# Patient Record
Sex: MALE | Race: WHITE | NOT HISPANIC OR LATINO | Employment: OTHER | URBAN - METROPOLITAN AREA
[De-identification: names, ages, dates, MRNs, and addresses within clinical notes are randomized per-mention and may not be internally consistent; named-entity substitution may affect disease eponyms.]

---

## 2023-09-06 PROBLEM — T14.8XXA BLEEDING FROM WOUND: Status: ACTIVE | Noted: 2023-09-06

## 2023-09-06 PROBLEM — L81.4 SOLAR LENTIGO: Status: ACTIVE | Noted: 2023-09-06

## 2023-09-06 PROBLEM — Z85.828 HISTORY OF BASAL CELL CARCINOMA (BCC): Status: ACTIVE | Noted: 2023-09-06

## 2023-09-06 PROBLEM — D18.00 ANGIOMA: Status: ACTIVE | Noted: 2023-09-06

## 2023-09-06 PROBLEM — I10 HYPERTENSION: Status: ACTIVE | Noted: 2023-09-06

## 2023-09-06 PROBLEM — L57.0 ACTINIC KERATOSES: Status: ACTIVE | Noted: 2023-09-06

## 2023-09-06 PROBLEM — E78.5 HYPERLIPEMIA: Status: ACTIVE | Noted: 2023-09-06

## 2023-09-06 PROBLEM — L82.1 SEBORRHEIC KERATOSES: Status: ACTIVE | Noted: 2023-09-06

## 2023-09-06 PROBLEM — L81.9 CHANGING PIGMENTED SKIN LESION: Status: ACTIVE | Noted: 2023-09-06

## 2023-09-06 PROBLEM — J33.9 NASAL POLYPS: Status: ACTIVE | Noted: 2023-09-06

## 2023-09-06 PROBLEM — R79.89 ELEVATED LFTS: Status: ACTIVE | Noted: 2023-09-06

## 2023-09-06 RX ORDER — HYDROCHLOROTHIAZIDE 12.5 MG/1
1 CAPSULE ORAL DAILY
COMMUNITY
Start: 2020-09-01

## 2023-09-06 RX ORDER — SIMVASTATIN 20 MG/1
TABLET, FILM COATED ORAL
COMMUNITY

## 2023-09-07 ENCOUNTER — OFFICE VISIT (OUTPATIENT)
Dept: PRIMARY CARE | Facility: CLINIC | Age: 78
End: 2023-09-07
Payer: MEDICARE

## 2023-09-07 VITALS
TEMPERATURE: 97.8 F | SYSTOLIC BLOOD PRESSURE: 128 MMHG | HEART RATE: 54 BPM | HEIGHT: 68 IN | BODY MASS INDEX: 25.91 KG/M2 | RESPIRATION RATE: 16 BRPM | DIASTOLIC BLOOD PRESSURE: 72 MMHG | WEIGHT: 171 LBS

## 2023-09-07 DIAGNOSIS — Z00.00 ROUTINE GENERAL MEDICAL EXAMINATION AT HEALTH CARE FACILITY: Primary | ICD-10-CM

## 2023-09-07 DIAGNOSIS — I70.0 ATHEROSCLEROSIS OF AORTA (CMS-HCC): ICD-10-CM

## 2023-09-07 PROCEDURE — 1159F MED LIST DOCD IN RCRD: CPT | Performed by: FAMILY MEDICINE

## 2023-09-07 PROCEDURE — 1160F RVW MEDS BY RX/DR IN RCRD: CPT | Performed by: FAMILY MEDICINE

## 2023-09-07 PROCEDURE — G0439 PPPS, SUBSEQ VISIT: HCPCS | Performed by: FAMILY MEDICINE

## 2023-09-07 PROCEDURE — 1036F TOBACCO NON-USER: CPT | Performed by: FAMILY MEDICINE

## 2023-09-07 PROCEDURE — 3078F DIAST BP <80 MM HG: CPT | Performed by: FAMILY MEDICINE

## 2023-09-07 PROCEDURE — 1170F FXNL STATUS ASSESSED: CPT | Performed by: FAMILY MEDICINE

## 2023-09-07 PROCEDURE — 3074F SYST BP LT 130 MM HG: CPT | Performed by: FAMILY MEDICINE

## 2023-09-07 PROCEDURE — 1126F AMNT PAIN NOTED NONE PRSNT: CPT | Performed by: FAMILY MEDICINE

## 2023-09-07 ASSESSMENT — ACTIVITIES OF DAILY LIVING (ADL)
BATHING: INDEPENDENT
DOING_HOUSEWORK: INDEPENDENT
MANAGING_FINANCES: INDEPENDENT
TAKING_MEDICATION: INDEPENDENT
GROCERY_SHOPPING: INDEPENDENT
DRESSING: INDEPENDENT

## 2023-09-07 ASSESSMENT — PATIENT HEALTH QUESTIONNAIRE - PHQ9
SUM OF ALL RESPONSES TO PHQ9 QUESTIONS 1 AND 2: 0
2. FEELING DOWN, DEPRESSED OR HOPELESS: NOT AT ALL
1. LITTLE INTEREST OR PLEASURE IN DOING THINGS: NOT AT ALL

## 2023-09-07 NOTE — PROGRESS NOTES
History Of Present Illness  Carlo Dunne is a 77 y.o. male presenting for a Medicare Annual Wellness Exam.  Patient is here for a periodic health exam.  I reviewed previous preventative health measures including screening tests, immunizations and labs.  I reviewed screenings administered by my staff today.    He goes to [facility].  Has labs every 6 months with them and always good.  I do have labs from September 2022 that were scanned into the chart.  I do not see a PSA on these labs but he says it is definitely checked every year and has been normal.  He does not not want me to order labs because they are fully covered at the [facility].  He says he will mail us a copy of the labs that he will be getting in the near future.    He never gets immunizations and refuses them again today.  He feels they are not needed and not safe.  He and I talked about this many times but he adamantly refuses vaccines.       PREVIOUS PREVENTATIVE HEALTH  Colonoscopy : Yes    Date: 3/8/2018  Cologuard : No  Prostate cancer screening with PSA : No  Hepatitis C Antibody : No  Prevnar : No  Shingrix : No  Tdap within 10 years : Yes  Yearly Flu Shot : No    CURRENT FINDINGS  Falls : No  Problems with ADL's :  No  Healthy Diet : Yes  Exercise :  Yes  Vision or Hearing Problems : No  Depression Issues : No  Alcohol Use : No  Tobacco Use : No         Past Medical History  Patient Active Problem List   Diagnosis    Actinic keratoses    Angioma    Bleeding from wound    Changing pigmented skin lesion    Elevated LFTs    History of basal cell carcinoma (BCC)    Hyperlipemia    Hypertension    Nasal polyps    Seborrheic keratoses    Solar lentigo    Atherosclerosis of aorta (CMS/HCC)       History reviewed. No pertinent surgical history.     Current Outpatient Medications:     hydroCHLOROthiazide (Microzide) 12.5 mg capsule, Take 1 capsule (12.5 mg) by mouth once daily., Disp: , Rfl:     simvastatin (Zocor) 20 mg tablet, Take by mouth.,  "Disp: , Rfl:    Immunization History   Administered Date(s) Administered    Td vaccine, age 7 years and older (TDVAX) 12/08/2017        Social History  Social History     Socioeconomic History    Marital status:      Spouse name: Not on file    Number of children: Not on file    Years of education: Not on file    Highest education level: Not on file   Occupational History    Not on file   Tobacco Use    Smoking status: Never    Smokeless tobacco: Never   Vaping Use    Vaping Use: Never used   Substance and Sexual Activity    Alcohol use: Not Currently    Drug use: Never    Sexual activity: Not on file   Other Topics Concern    Not on file   Social History Narrative    Not on file     Social Determinants of Health     Financial Resource Strain: Not on file   Food Insecurity: Not on file   Transportation Needs: Not on file   Physical Activity: Not on file   Stress: Not on file   Social Connections: Not on file   Intimate Partner Violence: Not on file   Housing Stability: Not on file        reports that he does not currently use alcohol.    reports that he has never smoked. He has never used smokeless tobacco.    reports no history of drug use.           Allergies  Patient has no known allergies.      Physical Exam   height is 1.727 m (5' 8\") and weight is 77.6 kg (171 lb). His temperature is 36.6 °C (97.8 °F). His blood pressure is 128/72 and his pulse is 54. His respiration is 16.    Physical Exam  Vitals and nursing note reviewed.   Constitutional:       General: He is not in acute distress.     Appearance: Normal appearance.   HENT:      Head: Normocephalic and atraumatic.      Right Ear: Tympanic membrane, ear canal and external ear normal.      Left Ear: Tympanic membrane, ear canal and external ear normal.      Nose: Nose normal.      Mouth/Throat:      Mouth: Mucous membranes are moist.      Pharynx: Oropharynx is clear.   Eyes:      Extraocular Movements: Extraocular movements intact.      " Conjunctiva/sclera: Conjunctivae normal.      Pupils: Pupils are equal, round, and reactive to light.   Cardiovascular:      Rate and Rhythm: Normal rate and regular rhythm.      Pulses: Normal pulses.      Heart sounds: Normal heart sounds. No murmur heard.     No friction rub. No gallop.   Pulmonary:      Effort: Pulmonary effort is normal. No respiratory distress.      Breath sounds: Normal breath sounds.   Abdominal:      General: Abdomen is flat. Bowel sounds are normal. There is no distension.      Palpations: Abdomen is soft.      Tenderness: There is no abdominal tenderness.   Musculoskeletal:         General: Normal range of motion.      Cervical back: Normal range of motion and neck supple.   Lymphadenopathy:      Cervical: No cervical adenopathy.   Skin:     General: Skin is warm and dry.      Findings: No lesion or rash.   Neurological:      General: No focal deficit present.      Mental Status: He is alert. Mental status is at baseline.   Psychiatric:         Mood and Affect: Mood normal.         Behavior: Behavior normal.         Thought Content: Thought content normal.         Judgment: Judgment normal.                 Assessment      1. Routine general medical examination at health care facility  1 Year Follow Up In [facility] - PCP - Wellness Exam      2. Atherosclerosis of aorta (CMS/HCC)     This has been stable and is being [facility] at least yearly.        I recommend regular exercise, balanced diet, regular dental exams, and healthy habits.  Patient denies depression sxs.  Patient is able to perform all ADL's without assistance.  I reminded patient to get yearly eye exams with glaucoma screening.  I recommend to eat plenty of plant foods (such as whole-grain products, fruits, and vegetables) and a moderate amount of lean and low-fat, animal-based food (meat and dairy products).  When shopping, choose lean meats, fish, and poultry. I recommend to try to get regular  aerobic exercise.  I recommend a yearly flu shot in the fall and I recommend a yearly wellness exam.      [facility] and he says he will mail us a copy of the labs.  I was unable to view the labs through our portal.  He completely refuses all vaccines as above.  His colonoscopy was done 5 years ago.               Lizandro Salinas MD

## 2023-09-12 PROBLEM — I70.0 ATHEROSCLEROSIS OF AORTA (CMS-HCC): Status: ACTIVE | Noted: 2023-09-12

## 2024-09-11 ENCOUNTER — APPOINTMENT (OUTPATIENT)
Dept: PRIMARY CARE | Facility: CLINIC | Age: 79
End: 2024-09-11
Payer: MEDICARE

## 2024-09-11 VITALS
RESPIRATION RATE: 16 BRPM | SYSTOLIC BLOOD PRESSURE: 138 MMHG | TEMPERATURE: 97 F | WEIGHT: 173 LBS | BODY MASS INDEX: 26.22 KG/M2 | DIASTOLIC BLOOD PRESSURE: 76 MMHG | HEIGHT: 68 IN | HEART RATE: 74 BPM

## 2024-09-11 DIAGNOSIS — Z00.00 ROUTINE GENERAL MEDICAL EXAMINATION AT HEALTH CARE FACILITY: Primary | ICD-10-CM

## 2024-09-11 PROBLEM — E66.3 OVERWEIGHT: Status: ACTIVE | Noted: 2024-09-11

## 2024-09-11 PROBLEM — K21.9 GASTROESOPHAGEAL REFLUX DISEASE: Status: ACTIVE | Noted: 2024-09-11

## 2024-09-11 PROBLEM — G47.00 INSOMNIA: Status: ACTIVE | Noted: 2024-09-11

## 2024-09-11 PROBLEM — E29.9 TESTICULAR DYSFUNCTION: Status: ACTIVE | Noted: 2024-09-11

## 2024-09-11 PROBLEM — L30.1 DYSHIDROSIS: Status: ACTIVE | Noted: 2024-09-11

## 2024-09-11 PROBLEM — K76.0 FATTY (CHANGE OF) LIVER, NOT ELSEWHERE CLASSIFIED: Status: ACTIVE | Noted: 2024-09-11

## 2024-09-11 PROCEDURE — 1170F FXNL STATUS ASSESSED: CPT | Performed by: FAMILY MEDICINE

## 2024-09-11 PROCEDURE — 3078F DIAST BP <80 MM HG: CPT | Performed by: FAMILY MEDICINE

## 2024-09-11 PROCEDURE — G0439 PPPS, SUBSEQ VISIT: HCPCS | Performed by: FAMILY MEDICINE

## 2024-09-11 PROCEDURE — 1160F RVW MEDS BY RX/DR IN RCRD: CPT | Performed by: FAMILY MEDICINE

## 2024-09-11 PROCEDURE — 1036F TOBACCO NON-USER: CPT | Performed by: FAMILY MEDICINE

## 2024-09-11 PROCEDURE — 3075F SYST BP GE 130 - 139MM HG: CPT | Performed by: FAMILY MEDICINE

## 2024-09-11 PROCEDURE — 99397 PER PM REEVAL EST PAT 65+ YR: CPT | Performed by: FAMILY MEDICINE

## 2024-09-11 PROCEDURE — 1159F MED LIST DOCD IN RCRD: CPT | Performed by: FAMILY MEDICINE

## 2024-09-11 PROCEDURE — 1123F ACP DISCUSS/DSCN MKR DOCD: CPT | Performed by: FAMILY MEDICINE

## 2024-09-11 PROCEDURE — 1158F ADVNC CARE PLAN TLK DOCD: CPT | Performed by: FAMILY MEDICINE

## 2024-09-11 RX ORDER — TEMAZEPAM 30 MG/1
30 CAPSULE ORAL NIGHTLY PRN
COMMUNITY

## 2024-09-11 RX ORDER — VITAMIN E MIXED 400 UNIT
CAPSULE ORAL
COMMUNITY

## 2024-09-11 ASSESSMENT — ACTIVITIES OF DAILY LIVING (ADL)
GROCERY_SHOPPING: INDEPENDENT
DOING_HOUSEWORK: INDEPENDENT
DRESSING: INDEPENDENT
BATHING: INDEPENDENT
TAKING_MEDICATION: INDEPENDENT
MANAGING_FINANCES: INDEPENDENT

## 2024-09-11 ASSESSMENT — PATIENT HEALTH QUESTIONNAIRE - PHQ9
SUM OF ALL RESPONSES TO PHQ9 QUESTIONS 1 AND 2: 0
1. LITTLE INTEREST OR PLEASURE IN DOING THINGS: NOT AT ALL
2. FEELING DOWN, DEPRESSED OR HOPELESS: NOT AT ALL

## 2024-09-11 NOTE — PROGRESS NOTES
History Of Present Illness  Carlo Dunne is a 78 y.o. male presenting for a Medicare Annual Wellness Exam.  Patient is here for a periodic health exam.  I reviewed previous preventative health measures including screening tests, immunizations and labs.  I reviewed screenings administered by my staff today.       PREVIOUS PREVENTATIVE HEALTH     Patient goes to the [facility] for routine medical care.  He says he has labs every 6 months with them and has been told they are always good.  He says they check his PSA every year and it is normal.  I cannot access those labs.    Refuses all vaccines.    He does not sleep well and was on Temazepam about 3 times per week but [facility] because [facility] did not think [facility] needed it.  Now he is not sleeping very well again.  [facility] to [facility] and discuss options.    Colonoscopy : Yes    Date: 3/2018  Cologuard : No  Prostate cancer screening with PSA : Yes    Date: at [facility] recently  Hepatitis C Antibody : Yes    [facility]  Prevnar : No  Shingrix : No  Tdap within 10 years : Yes  Yearly Flu Shot : No      CURRENT FINDINGS    Falls : No  Problems with ADL's :  No  Healthy Diet : Yes  Exercise :  No  Vision or Hearing Problems : No  Depression Issues : No  Alcohol Use : No  Tobacco Use : No         Past Medical History  Patient Active Problem List   Diagnosis    Actinic keratoses    Angioma    Bleeding from wound    Changing pigmented skin lesion    Elevated LFTs    History of basal cell carcinoma (BCC)    Hyperlipemia    Hypertension    Nasal polyps    Seborrheic keratoses    Solar lentigo    Atherosclerosis of aorta (CMS-HCC)    Bile leak, postoperative    Cholecystitis, chronic    Choledocholithiasis    Dyshidrosis    Fatty (change of) liver, not elsewhere classified    Gastroesophageal reflux disease    Insomnia    Overweight    Testicular dysfunction       History reviewed. No pertinent surgical history.  "    Current Outpatient Medications:     hydroCHLOROthiazide (Microzide) 12.5 mg capsule, Take 1 capsule (12.5 mg) by mouth once daily., Disp: , Rfl:     simvastatin (Zocor) 20 mg tablet, Take by mouth., Disp: , Rfl:     temazepam (Restoril) 30 mg capsule, Take 1 capsule (30 mg) by mouth as needed at bedtime for sleep. [facility], Disp: , Rfl:     vitamin E 450 mg (1000 unit) capsule, , Disp: , Rfl:    Immunization History   Administered Date(s) Administered    Td vaccine, age 7 years and older (TDVAX) 12/08/2017        Social History  Social History     Socioeconomic History    Marital status:      Spouse name: Not on file    Number of children: Not on file    Years of education: Not on file    Highest education level: Not on file   Occupational History    Not on file   Tobacco Use    Smoking status: Never    Smokeless tobacco: Never   Vaping Use    Vaping status: Never Used   Substance and Sexual Activity    Alcohol use: Not Currently    Drug use: Never    Sexual activity: Not on file   Other Topics Concern    Not on file   Social History Narrative    Not on file     Social Determinants of Health     Financial Resource Strain: Not on file   Food Insecurity: Not on file   Transportation Needs: Not on file   Physical Activity: Not on file   Stress: Not on file   Social Connections: Not on file   Intimate Partner Violence: Not on file   Housing Stability: Not on file        reports that he does not currently use alcohol.    reports that he has never smoked. He has never used smokeless tobacco.    reports no history of drug use.           Allergies  Patient has no known allergies.      Physical Exam   height is 1.727 m (5' 8\") and weight is 78.5 kg (173 lb). His temperature is 36.1 °C (97 °F). His blood pressure is 138/76 and his pulse is 74. His respiration is 16.    Physical Exam  Vitals and nursing note reviewed.   Constitutional:       General: He is not in acute distress.     Appearance: Normal " appearance.   HENT:      Head: Normocephalic and atraumatic.      Right Ear: Tympanic membrane, ear canal and external ear normal.      Left Ear: Tympanic membrane, ear canal and external ear normal.      Nose: Nose normal.      Mouth/Throat:      Mouth: Mucous membranes are moist.      Pharynx: Oropharynx is clear.   Eyes:      Extraocular Movements: Extraocular movements intact.      Conjunctiva/sclera: Conjunctivae normal.      Pupils: Pupils are equal, round, and reactive to light.   Cardiovascular:      Rate and Rhythm: Normal rate and regular rhythm.      Pulses: Normal pulses.      Heart sounds: Normal heart sounds. No murmur heard.     No friction rub. No gallop.   Pulmonary:      Effort: Pulmonary effort is normal. No respiratory distress.      Breath sounds: Normal breath sounds.   Abdominal:      General: Abdomen is flat. Bowel sounds are normal. There is no distension.      Palpations: Abdomen is soft.      Tenderness: There is no abdominal tenderness.   Musculoskeletal:         General: Normal range of motion.      Cervical back: Normal range of motion and neck supple.   Lymphadenopathy:      Cervical: No cervical adenopathy.   Skin:     General: Skin is warm and dry.      Findings: No lesion or rash.   Neurological:      General: No focal deficit present.      Mental Status: He is alert. Mental status is at baseline.   Psychiatric:         Mood and Affect: Mood normal.         Behavior: Behavior normal.         Thought Content: Thought content normal.         Judgment: Judgment normal.                 Assessment      1. Routine general medical examination at health care facility  1 Year Follow Up In [facility] - PCP - Wellness Exam   The patient says [facility] has [facility] and was told they are normal.  I cannot access those labs.  He completely declines and refuses all vaccines because he says he does not believe in them and does not think they are necessary.  He had a  colonoscopy in 2018.        I recommend regular exercise, balanced diet, regular dental exams, and healthy habits.  Patient denies depression sxs.  Patient is able to perform all ADL's without assistance.  I reminded patient to get yearly eye exams with glaucoma screening.  I recommend to eat plenty of plant foods (such as whole-grain products, fruits, and vegetables) and a moderate amount of lean and low-fat, animal-based food (meat and dairy products).  When shopping, choose lean meats, fish, and poultry. I recommend to try to get regular aerobic exercise.  I recommend a yearly flu shot in the fall and I recommend a yearly wellness exam.            No orders of the defined types were placed in this encounter.       New Medications Ordered This Visit   Medications    vitamin E 450 mg (1000 unit) capsule    temazepam (Restoril) 30 mg capsule     Sig: Take 1 capsule (30 mg) by mouth as needed at bedtime for sleep. [facility]                   Lizandro Salinas MD

## 2025-09-11 ENCOUNTER — APPOINTMENT (OUTPATIENT)
Dept: PRIMARY CARE | Facility: CLINIC | Age: 80
End: 2025-09-11
Payer: MEDICARE

## 2025-10-09 ENCOUNTER — APPOINTMENT (OUTPATIENT)
Dept: PRIMARY CARE | Facility: CLINIC | Age: 80
End: 2025-10-09
Payer: MEDICARE